# Patient Record
Sex: FEMALE | Race: WHITE | NOT HISPANIC OR LATINO | URBAN - METROPOLITAN AREA
[De-identification: names, ages, dates, MRNs, and addresses within clinical notes are randomized per-mention and may not be internally consistent; named-entity substitution may affect disease eponyms.]

---

## 2018-04-16 ENCOUNTER — OFFICE VISIT - HEALTHEAST (OUTPATIENT)
Dept: FAMILY MEDICINE | Facility: CLINIC | Age: 58
End: 2018-04-16

## 2018-04-16 DIAGNOSIS — S05.90XA: ICD-10-CM

## 2019-05-22 ENCOUNTER — OFFICE VISIT - HEALTHEAST (OUTPATIENT)
Dept: FAMILY MEDICINE | Facility: CLINIC | Age: 59
End: 2019-05-22

## 2019-05-22 DIAGNOSIS — J44.1 COPD WITH ACUTE EXACERBATION (H): ICD-10-CM

## 2019-05-22 RX ORDER — LEVOTHYROXINE SODIUM 150 UG/1
TABLET ORAL
Status: SHIPPED | COMMUNITY
Start: 2019-03-13

## 2019-05-22 RX ORDER — METHYLPREDNISOLONE 4 MG
1 TABLET, DOSE PACK ORAL
Status: SHIPPED | COMMUNITY
Start: 2019-05-22

## 2019-05-22 RX ORDER — ALBUTEROL SULFATE 90 UG/1
AEROSOL, METERED RESPIRATORY (INHALATION)
Status: SHIPPED | COMMUNITY
Start: 2018-07-18

## 2019-05-22 RX ORDER — GLUCOSAMINE HCL/CHONDROITIN SU 500-400 MG
CAPSULE ORAL
Status: SHIPPED | COMMUNITY
Start: 2018-09-20

## 2019-05-22 RX ORDER — ALBUTEROL SULFATE 0.83 MG/ML
3 SOLUTION RESPIRATORY (INHALATION)
Status: SHIPPED | COMMUNITY
Start: 2018-09-18

## 2019-05-22 RX ORDER — COVID-19 ANTIGEN TEST
220 KIT MISCELLANEOUS
Status: SHIPPED | COMMUNITY
Start: 2018-10-15

## 2019-05-22 RX ORDER — ATENOLOL 50 MG/1
TABLET ORAL
Status: SHIPPED | COMMUNITY
Start: 2019-04-21

## 2019-05-22 RX ORDER — LOVASTATIN 40 MG
TABLET ORAL
Status: SHIPPED | COMMUNITY
Start: 2019-04-02

## 2019-05-22 RX ORDER — DOXYCYCLINE 100 MG/1
100 CAPSULE ORAL 2 TIMES DAILY
Qty: 20 CAPSULE | Refills: 0 | Status: SHIPPED | OUTPATIENT
Start: 2019-05-22

## 2019-05-22 RX ORDER — BENZONATATE 200 MG/1
200 CAPSULE ORAL 3 TIMES DAILY PRN
Qty: 30 CAPSULE | Refills: 0 | Status: SHIPPED | OUTPATIENT
Start: 2019-05-22

## 2019-05-22 RX ORDER — LOSARTAN POTASSIUM AND HYDROCHLOROTHIAZIDE 12.5; 5 MG/1; MG/1
TABLET ORAL
Status: SHIPPED | COMMUNITY
Start: 2019-04-02

## 2021-05-29 NOTE — PROGRESS NOTES
Name: Sara Pate  Age: 58 y.o.  Gender: female  : 1960  Date of Encounter: 2019      HPI:  Sara Pate is a 58 y.o.  female with history of COPD who presents to the clinic with concerns of cough and shortness of breath.  Patient reports symptoms started approximately 2 weeks ago with fever head congestion cough and diarrhea.  Fever and diarrhea have resolved however she now has a cough productive of yellowish phlegm.  Cough makes it difficult to sleep at night.  She has had increasing shortness of breath with activity over the last 2 days.  She is using an albuterol inhaler 3 to 4 puffs the last 2 days with some improvement in her symptoms.  Denies fever, chills, ear pain, sore throat, wheezing, chest pain, lower extremity pain or swelling, palpitations, nausea, vomiting, diarrhea and skin rash.    Current Medication:   Medications reviewed and updated.    Current Outpatient Medications:      albuterol (PROVENTIL) 2.5 mg /3 mL (0.083 %) nebulizer solution, Inhale 3 mL., Disp: , Rfl:      albuterol sulfate (VENTOLIN HFA INHL),  2 puff(s), INH, QID, Instructions: Will call to fill., PRN: for wheezing, # 1 EA, 11 Refill(s), Type: Maintenance, Pharmacy: [facility], 2 puff(s) Inhale qid,PRN:for wheezing,Instr:Will call to fill., Disp: , Rfl:      atenolol (TENORMIN) 50 MG tablet, , Disp: , Rfl:      blood glucose test (ACCU-CHEK SMARTVIEW TEST STRIP) strips,  See Instructions, Instructions: USE AS DIRECTED TO TEST ONCE DAILY, # 100 unknown unit, 2 Refill(s), Type: Soft Stop, Pharmacy: [facility], USE AS DIRECTED TO TEST ONCE DAILY, Disp: , Rfl:      glucosamine sulfate (GLUCOSAMINE) 500 mg Tab, 1 tablet., Disp: , Rfl:      levothyroxine (SYNTHROID, LEVOTHROID) 150 MCG tablet, Take by mouth., Disp: , Rfl:      losartan-hydrochlorothiazide (HYZAAR) 25-6.25 mg, Take by mouth., Disp: , Rfl:      lovastatin (MEVACOR) 40 MG tablet, Take by mouth.,  Disp: , Rfl:      metFORMIN (GLUCOPHAGE) 500 MG tablet, Take by mouth., Disp: , Rfl:      multivitamin (MULTIPLE VITAMIN ORAL), , Disp: , Rfl:      naproxen sodium 220 mg cap, Take 220 mg by mouth., Disp: , Rfl:      VENTOLIN HFA 90 mcg/actuation inhaler, , Disp: , Rfl:     Current Facility-Administered Medications:      tetracaine (PF) 0.5 % ophthalmic solution 2 drop (PONTOCAINE), 2 drop, Left Eye, Once, Pamela Austin CNP    Past Med / Surg History:  No past medical history on file.  No past surgical history on file.    Fam / Soc History:  No family history on file.  Social History     Socioeconomic History     Marital status:      Spouse name: Not on file     Number of children: Not on file     Years of education: Not on file     Highest education level: Not on file   Occupational History     Not on file   Social Needs     Financial resource strain: Not on file     Food insecurity:     Worry: Not on file     Inability: Not on file     Transportation needs:     Medical: Not on file     Non-medical: Not on file   Tobacco Use     Smoking status: Current Every Day Smoker     Types: Cigars     Smokeless tobacco: Never Used   Substance and Sexual Activity     Alcohol use: Not on file     Drug use: Not on file     Sexual activity: Not on file   Lifestyle     Physical activity:     Days per week: Not on file     Minutes per session: Not on file     Stress: Not on file   Relationships     Social connections:     Talks on phone: Not on file     Gets together: Not on file     Attends [employer]oration[employer] service: Not on file     Active member of club or organization: Not on file     Attends meetings of clubs or organizations: Not on file     Relationship status: Not on file     Intimate partner violence:     Fear of current or ex partner: Not on file     Emotionally abused: Not on file     Physically abused: Not on file     Forced sexual activity: Not on file   Other Topics Concern     Not on file   Social History  Narrative     Not on file       ROS:  14 point review of systems unremarkable except as mentioned in HPI  Review of Systems    Objective:  Vitals: /79 (Patient Site: Right Arm, Patient Position: Sitting, Cuff Size: Adult Large)   Pulse 74   Temp 98.2  F (36.8  C) (Oral)   Resp 18   Wt (!) 249 lb 3.2 oz (113 kg)   SpO2 95%     Gen: Alert, awake, well appearing  HEENT: NC, AT,   Ears:  Ear canals clear.  TMs normal appearing.  Eyes:  EOMI.  Pupils equally round and reactive to light. Conjunctivae clear.  Sclera non-icteric.  Nose:  Nasal mucosa pink, septum midline.  No sinus tenderness  Mouth:  MMM. Oropharynx clear. No tonsillar exudate. Teeth, gum, tongue and lips clear.  Neck:  Supple, FROM, No lymphandenpathy, No TM  Heart: Regular rate and rhythm; normal S1 and S2; no murmurs, gallops, or rubs.  Peripheral Vessels: Normal pulses and perfusion.  Lungs: Unlabored respirations; symmetric chest expansion; distant breath sounds with few scattered wheezes in bases.  No crackles  Extremities: No clubbing, cyanosis, or edema. Normal upper and lower extremities.  Skin: Normal turgor and without lesions or rashes.  Mental Status: Alert, oriented, in no distress. Mood and affect appropriate.  Neuro: Normal tone; no focal deficits appreciated.Gait and stance normal.     Pertinent results / imaging:  none    Assessment: 1.COPD exacerbation    Plan: Advised fluids, rest use of over-the-counter Mucinex.  Prescribed prednisone 40 mg once daily for 5 days, doxycycline 100 mg twice daily for 10 days and Tessalon Perls 3 times daily as needed for cough.  She is to continue to use her Ventolin inhaler 3-4 times daily for the next 2 weeks.  Reviewed expected course, duration of symptoms and reasons to return for reevaluation.  Patient voiced understanding and was in agreement with plan.      Lynn Pineda MD  5/22/2019

## 2021-06-02 VITALS — WEIGHT: 249.2 LBS

## 2021-06-17 NOTE — PROGRESS NOTES
Assessment:     1. Eye injury, non-penetrating  tetracaine (PF) 0.5 % ophthalmic solution 2 drop (PONTOCAINE)    DISCONTINUED: tetracaine (PF) 0.5 % ophthalmic solution 2 drop (PONTOCAINE)          Plan:     Patient cornea visualized after cleaning with normal saline 0.9%.  Then tetracaine 0.5% ophthalmic solution 2 drops were instilled.  Stain was applied then visualized within nitrate.  There was no cornea abrasion noted.  Discussed with the patient about the findings.  Patient advised to monitor for worsening symptoms including visual disturbance, pain, or increased headache.  Patient is aware that if she experiences any of the symptoms to follow-up with an ophthalmologist or go to emergency room for further evaluation.  For now she is to use supportive care which will include rest, may use an eye shield.    Subjective:       57 y.o. female presents for evaluation of an injury while at work.  Patient reports that she had a needle Hit her left eye.  For now she denies pain, she reports some discomfort, previous to her coming to be seen she reports that her eye was red and he was teary.  She reports that she is feeling a headache, she reports that she feels like her eyes terminated and he feels bilirubin is closed.  Patient has history of glaucoma and she denies any other symptoms.    The following portions of the patient's history were reviewed and updated as appropriate: allergies, current medications, past family history, past medical history, past social history, past surgical history and problem list.    Review of Systems  Pertinent items are noted in HPI.  A 12 point comprehensive review of systems was negative except as noted.  Constitutional: negative  Eyes: positive for glaucoma, irritation, redness and visual disturbance  Respiratory: negative  Cardiovascular: negative  Behavioral/Psych: negative     Objective:      /70 (Patient Site: Right Arm, Patient Position: Sitting, Cuff Size: Adult Large)   Pulse 93  Temp 97.9  F (36.6  C) (Oral)   Resp 16  SpO2 96%  General appearance: alert, appears stated age, cooperative and no distress  Head: Normocephalic, without obvious abnormality, atraumatic  Eyes: negative findings: lids and lashes normal, conjunctivae and sclerae normal, corneas clear, pupils equal, round, reactive to light and accomodation, visual fields full to confrontation, optic nerve appearance unremarkable and no foreign body with everted lid  Lungs: clear to auscultation bilaterally  Heart: regular rate and rhythm, S1, S2 normal, no murmur, click, rub or gallop  Skin: Skin color, texture, turgor normal. No rashes or lesions  Neurologic: Grossly normal     This note has been dictated using voice recognition software. Any grammatical or context distortions are unintentional and inherent to the software

## 2021-06-17 NOTE — PATIENT INSTRUCTIONS - HE
Patient Instructions by Lynn Pineda MD at 5/22/2019  4:10 PM     Author: Lynn Pineda MD Service: -- Author Type: Physician    Filed: 5/22/2019 10:36 PM Encounter Date: 5/22/2019 Status: Signed    : Lynn Pineda MD (Physician)         Patient Education     COPD Flare    You have had a flare-up of your COPD.  COPD, or chronic obstructive pulmonary disease, is a common lung disease. It causes your airways to become irritated and narrower. This makes it harder for you to breathe. Emphysema and chronic bronchitis are both types of COPD. This is a chronic condition, which means you always have it. Sometimes it gets worse. When this happens, it is called a flare-up.  Symptoms of COPD  People with COPD may have symptoms most of the time. In a flare-up, your symptoms get worse. These symptoms may mean you are having a flare-up:    Shortness of breath, shallow or rapid breathing, or wheezing that gets worse    Lung infection    Cough that gets worse    More mucus, thicker mucus or mucus of a different color    Tiredness, decreased energy, or trouble doing your usual activities    Fever    Chest tightness    Your symptoms dont get better even when you use your usual medicines, inhalers, and nebulizer    Trouble talking    You feel confused  Causes of flare-ups  Unfortunately, a flare-up can happen even though you did everything right, and you followed your doctors instructions. Some causes of flare-ups are:    Smoking or secondhand smoke    Colds, the flu, or respiratory infections    Air pollution    Sudden change in the weather    Dust, irritating chemicals, or strong fumes    Not taking your medicines as prescribed  Home care  Here are some things you can do at home to treat a flare-up:    Try not to panic. This makes it harder to breathe, and keeps you from doing the right things.    Dont smoke or be around others who are smoking.    Try to drink more fluids than usual during a flare-up,  unless your doctor has told you not to because of heart and kidney problems. More fluids can help loosen the mucus.    Use your inhalers and nebulizer, if you have one, as you have been told to.    If you were given antibiotics, take them until they are used up or your doctor tells you to stop. Its important to finish the antibiotics, even though you feel better. This will make sure the infection has cleared.    If you were given prednisone or another steroid, finish it even if you feel better.  Preventing a flare-up  Even though flare-ups happen, the best way to treat one is to prevent it before it starts. Here are some pointers:    Dont smoke or be around others who are smoking.    Take your medicines as you have been told.    Talk with your doctor about getting a flu shot every year. Also find out if you need a pneumonia shot.    If there is a weather advisory warning to stay indoors, try to stay inside when possible.    Try to eat healthy and get plenty of sleep.    Try to avoid things that usually set you off, like dust, chemical fumes, hairsprays, or strong perfumes.  Follow-up care  Follow up with your healthcare provider, or as advised.  If a culture was done, you will be told if your treatment needs to be changed. You can call as directed for the results.  If X-rays were done, you will be notified of any new findings that may affect your care.  Call 911  Call 911 if any of these occur:    You have trouble breathing    You feel confused or its difficult to wake you up    You faint or lose consciousness    You have a rapid heart rate    You have new pain in your chest, arm, shoulder, neck or upper back  When to seek medical advice  Call your healthcare provider right away if any of these occur:    Wheezing or shortness of breath gets worse    You need to use your inhalers more often than usual without relief    Fever of 100.4 F (38 C) or higher, or as directed by your healthcare provider    Coughing up lots  of dark-colored or bloody mucus (sputum)    Chest pain with each breath    You do not start to get better within 24 hours    Swelling of your ankles gets worse    Dizziness or weakness  Date Last Reviewed: 9/1/2016 2000-2017 [email or web address]. [address] [place] [address], [place] [address]. All rights reserved. This information is not intended as a substitute for professional medical care. Always follow your healthcare professional's instructions.

## 2021-07-03 NOTE — ADDENDUM NOTE
Addendum Note by Mnauel Cisneros CNP at 4/23/2018  1:39 PM     Author: Manuel Cisneros CNP Service: -- Author Type: Nurse Practitioner    Filed: 4/23/2018  1:39 PM Encounter Date: 4/16/2018 Status: Signed    : Manuel Cisneros CNP (Nurse Practitioner)    Addended by: MANUEL CISNEROS on: 4/23/2018 01:39 PM        Modules accepted: Orders

## 2021-08-22 ENCOUNTER — HEALTH MAINTENANCE LETTER (OUTPATIENT)
Age: 61
End: 2021-08-22

## 2021-10-16 ENCOUNTER — HEALTH MAINTENANCE LETTER (OUTPATIENT)
Age: 61
End: 2021-10-16

## 2022-10-01 ENCOUNTER — HEALTH MAINTENANCE LETTER (OUTPATIENT)
Age: 62
End: 2022-10-01

## 2023-10-15 ENCOUNTER — HEALTH MAINTENANCE LETTER (OUTPATIENT)
Age: 63
End: 2023-10-15